# Patient Record
Sex: FEMALE | Race: BLACK OR AFRICAN AMERICAN | NOT HISPANIC OR LATINO | Employment: UNEMPLOYED | URBAN - METROPOLITAN AREA
[De-identification: names, ages, dates, MRNs, and addresses within clinical notes are randomized per-mention and may not be internally consistent; named-entity substitution may affect disease eponyms.]

---

## 2019-12-26 ENCOUNTER — HOSPITAL ENCOUNTER (OUTPATIENT)
Facility: HOSPITAL | Age: 34
Discharge: HOME | End: 2019-12-27
Attending: OBSTETRICS & GYNECOLOGY | Admitting: OBSTETRICS & GYNECOLOGY

## 2019-12-26 DIAGNOSIS — O46.90 VAGINAL BLEEDING DURING PREGNANCY: ICD-10-CM

## 2019-12-26 RX ORDER — LORATADINE 10 MG/1
10 TABLET ORAL
Status: ON HOLD | COMMUNITY
Start: 2019-02-28 | End: 2019-12-26 | Stop reason: CLARIF

## 2019-12-26 RX ORDER — ACETAMINOPHEN 500 MG
500 TABLET ORAL EVERY 6 HOURS PRN
Status: DISCONTINUED | OUTPATIENT
Start: 2019-12-27 | End: 2019-12-27 | Stop reason: HOSPADM

## 2019-12-26 RX ORDER — SODIUM CHLORIDE 9 MG/ML
INJECTION, SOLUTION INTRAVENOUS CONTINUOUS
Status: DISCONTINUED | OUTPATIENT
Start: 2019-12-27 | End: 2019-12-27 | Stop reason: HOSPADM

## 2019-12-26 RX ORDER — ONDANSETRON 8 MG/1
8 TABLET, ORALLY DISINTEGRATING ORAL EVERY 8 HOURS PRN
Status: DISCONTINUED | OUTPATIENT
Start: 2019-12-27 | End: 2019-12-27 | Stop reason: HOSPADM

## 2019-12-27 VITALS
TEMPERATURE: 98 F | SYSTOLIC BLOOD PRESSURE: 101 MMHG | RESPIRATION RATE: 18 BRPM | HEART RATE: 89 BPM | DIASTOLIC BLOOD PRESSURE: 62 MMHG

## 2019-12-27 LAB
ABO + RH BLD: NORMAL
BLD GP AB SCN CELLS X3 SERPL QL: NORMAL

## 2019-12-27 PROCEDURE — 99213 PR OFFICE/OUTPT VISIT, EST, LEVL III, 20-29 MIN: ICD-10-PCS | Mod: ,,, | Performed by: OBSTETRICS & GYNECOLOGY

## 2019-12-27 PROCEDURE — 99213 OFFICE O/P EST LOW 20 MIN: CPT | Mod: ,,, | Performed by: OBSTETRICS & GYNECOLOGY

## 2019-12-27 PROCEDURE — 99211 OFF/OP EST MAY X REQ PHY/QHP: CPT | Mod: 25,TH

## 2019-12-27 PROCEDURE — 86901 BLOOD TYPING SEROLOGIC RH(D): CPT

## 2019-12-27 NOTE — NURSING
Pt arrives to LD with complaints of bright bleeding that started this morning, pt placed in triage and monitor applied, CNM notified of pt arrival and complaints

## 2019-12-27 NOTE — NURSING
Spoke with Dr. Stewart from [facility] [place] in [place] where pt reports receives prenatal care, unable to obtain any records at this time

## 2019-12-27 NOTE — HPI
33 yo  20 weeks from home w/ vaginal bleeding. [place]. Staying locally for holiday w/e

## 2019-12-27 NOTE — DISCHARGE INSTRUCTIONS

## 2019-12-27 NOTE — PROGRESS NOTES
[facility] -   Obstetrics  Antepartum Progress Note    Patient Name: Charito Childs  MRN: [id number]  Admission Date: 2019  Hospital Length of Stay: 0 days  Attending Physician: Tiffany Hearn MD  Primary Care Provider: Rupal Anne NP    Subjective:     Principal Problem:Vaginal bleeding during pregnancy    HPI:  33 yo  20 weeks from home w/ vaginal bleeding. [place]. Staying locally for holiday w/e    Hospital Course:   20 weeks with vaginal bleeding  From [place] here visiting for holidays  Observe  FHTs via doppler  [place]  OB US- no evidence of previa  19 0530 US WNL, no contractions, cervix closed  T&S, if RH positive discharge home to follow up with OB MD, if RH negative rhogam prior to discharge.       Obstetric HPI:  Patient informed is B+  Patient reports None contractions, active fetal movement, absent vaginal bleeding , absent loss of fluid      Objective:     Vital Signs (Most Recent):  Temp: 98 °F (36.7 °C) (19 0711)  Pulse: 89 (19 0711)  Resp: 18 (19 0711)  BP: 101/62 (19 0711) Vital Signs (24h Range):  Temp:  [98 °F (36.7 °C)-98.7 °F (37.1 °C)] 98 °F (36.7 °C)  Pulse:  [87-89] 89  Resp:  [18] 18  BP: (101-121)/(62-78) 101/62        There is no height or weight on file to calculate BMI.    FHT:  Auscultated heart tones in 150's  TOCO: Acontractile.     No intake or output data in the 24 hours ending 19 0837    Cervical Exam:  Deferred as no cramping or bleeding at this time.      Significant Labs:  Recent Lab Results       19  0610        Group & Rh B POS     INDIRECT KAM NEG           Physical Exam:   Constitutional: She is oriented to person, place, and time. She appears well-developed and well-nourished.     Eyes: EOM are normal.      Pulmonary/Chest: Effort normal.        Abdominal: Soft.     Genitourinary: No vaginal discharge found.   Genitourinary Comments: Inspection of perineum w/o any vaginal discharge  or bleeding noted.            Musculoskeletal: Normal range of motion and moves all extremeties.       Neurological: She is alert and oriented to person, place, and time. She has normal reflexes.    Skin: Skin is warm and dry.    Psychiatric: She has a normal mood and affect.       Assessment/Plan:     34 y.o. female  at 20w2d for:    * Vaginal bleeding during pregnancy  Observe  OB US  FHTs via doppler  [place]  Spec exam with small amount of dark red blood,   SVE closed/thick  19 0530 T&S, Rhogam if RH neg  US WNL, no contractions, cervix closed  Discharge to follow up with OB MD Rakel Shah CNM  Obstetrics  [facility]

## 2019-12-27 NOTE — PROGRESS NOTES
[facility]  Obstetrics  Labor Progress Note    Patient Name: Charito Childs  MRN: [id number]  Admission Date: 2019  Hospital Length of Stay: 0 days  Attending Physician: Tiffany eHarn MD  Primary Care Provider: Rupal Anne NP    Subjective:     Principal Problem:Vaginal bleeding during pregnancy    Hospital Course:   20 weeks with vaginal bleeding  From [place] here visiting for holidays  Observe  FHTs via doppler  [place]  OB US  19 0530 US WNL, no contractions, cervix closed  T&S, if RH positive discharge home to follow up with OB MD, if RH negative rhogam prior to discharge    Interval History:  Charito is a 34 y.o.  at 20w2d. She is doing well.     Objective:     Vital Signs (Most Recent):  Temp: 98.7 °F (37.1 °C) (19 2315)  Pulse: 87 (19 2315)  Resp: 18 (19 2315)  BP: 121/78 (19 2315) Vital Signs (24h Range):  Temp:  [98.7 °F (37.1 °C)] 98.7 °F (37.1 °C)  Pulse:  [87] 87  Resp:  [18] 18  BP: (121)/(78) 121/78        There is no height or weight on file to calculate BMI.      TOCO:  none    Cervical Exam:  Dilation:    Effacement:    Station:   Presentation:      Significant Labs:  No results found for: GROUPTRH, HEPBSAG, RUBELLAIGGSC, STREPBCULT, AFP, [id number]    I have personallly reviewed all pertinent lab results from the last 24 hours.    Physical Exam    Assessment/Plan:     34 y.o. female  at 20w2d for:    * Vaginal bleeding during pregnancy  Observe  OB US  FHTs via doppler  [place]  Spec exam with small amount of dark red blood,   SVE closed/thick  19 0530 T&S, Rhogam if RH neg  US WNL, no contractions, cervix closed  Discharge to follow up with OB MD Radha Mueller, NISHA  Obstetrics  [facility]

## 2019-12-27 NOTE — ASSESSMENT & PLAN NOTE
Observe  OB US  FHTs via doppler  [place]  Spec exam with small amount of dark red blood,   SVE closed/thick

## 2019-12-27 NOTE — NURSING
Patient finished breakfast and is up to the bathroom. Patient instructed to get dress for discharge.

## 2019-12-27 NOTE — DISCHARGE SUMMARY
[facility]  Obstetrics  Discharge Summary      Patient Name: Charito Childs  MRN: [id number]  Admission Date: 2019  Hospital Length of Stay: 0 days  Discharge Date and Time:  2019 8:40 AM  Attending Physician: Tiffany Hearn MD   Discharging Provider: Rakel Shah CNM   Primary Care Provider: Rupal Anne NP    HPI: 35 yo  20 weeks from home w/ vaginal bleeding. [place] from [place]. Staying locally for holiday w/e    FHT:  FHT's 150's  TOCO:   No cramping.     * No surgery found *     Hospital Course:    20 weeks with vaginal bleeding  From [place] here visiting for holidays  Observe  FHTs via doppler  [place]  OB US- no evidence of previa  19 0530 US WNL, no contractions, cervix closed  T&S, if RH positive discharge home to follow up with OB MD, if RH negative rhogam prior to discharge.            Final Active Diagnoses:    Diagnosis Date Noted POA    PRINCIPAL PROBLEM:  Vaginal bleeding during pregnancy [O46.90] 2019 Yes      Problems Resolved During this Admission:        Labs: All labs within the past 24 hours have been reviewed    Feeding Method: undelivered    Immunizations     None          This patient has no babies on file.  Pending Diagnostic Studies:     None          Discharged Condition: good    Disposition: Home or Self Care    Follow Up:    Patient Instructions:   No discharge procedures on file.  Medications:  There are no discharge medications for this patient.      Rakel Shah CNM  Obstetrics  [facility]

## 2019-12-27 NOTE — SUBJECTIVE & OBJECTIVE
Obstetric HPI:  Patient informed is B+  Patient reports None contractions, active fetal movement, absent vaginal bleeding , absent loss of fluid      Objective:     Vital Signs (Most Recent):  Temp: 98 °F (36.7 °C) (12/27/19 0711)  Pulse: 89 (12/27/19 0711)  Resp: 18 (12/27/19 0711)  BP: 101/62 (12/27/19 0711) Vital Signs (24h Range):  Temp:  [98 °F (36.7 °C)-98.7 °F (37.1 °C)] 98 °F (36.7 °C)  Pulse:  [87-89] 89  Resp:  [18] 18  BP: (101-121)/(62-78) 101/62        There is no height or weight on file to calculate BMI.    FHT:  Auscultated heart tones in 150's  TOCO: Acontractile.     No intake or output data in the 24 hours ending 12/27/19 0837    Cervical Exam:  Deferred as no cramping or bleeding at this time.      Significant Labs:  Recent Lab Results       12/27/19  0610        Group & Rh B POS     INDIRECT KAM NEG           Physical Exam:   Constitutional: She is oriented to person, place, and time. She appears well-developed and well-nourished.     Eyes: EOM are normal.      Pulmonary/Chest: Effort normal.        Abdominal: Soft.     Genitourinary: No vaginal discharge found.   Genitourinary Comments: Inspection of perineum w/o any vaginal discharge or bleeding noted.            Musculoskeletal: Normal range of motion and moves all extremeties.       Neurological: She is alert and oriented to person, place, and time. She has normal reflexes.    Skin: Skin is warm and dry.    Psychiatric: She has a normal mood and affect.

## 2019-12-27 NOTE — SUBJECTIVE & OBJECTIVE
"Obstetric HPI:  Patient reports vaginal bleeding that started today while she was walking in [facility]. Reports here visiting family for holidays. Getting PNC in [place], [place].  with 4 term vaginal deliveries w/o complications, denies contractions, reports "sharp" pain at upper thighs. active fetal movement, Yes vaginal bleeding , No loss of fluid     This pregnancy has been complicated by denies    OB History    Para Term  AB Living   1 0 0 0 0 0   SAB TAB Ectopic Multiple Live Births   0 0 0 0 0      # Outcome Date GA Lbr Hany/2nd Weight Sex Delivery Anes PTL Lv   1 Current              No past medical history on file.  No past surgical history on file.    No medications prior to admission.       Review of patient's allergies indicates:  No Known Allergies     Family History     None        Tobacco Use    Smoking status: Not on file   Substance and Sexual Activity    Alcohol use: Not on file    Drug use: Not on file    Sexual activity: Not on file     Review of Systems   Constitutional: Negative for activity change, appetite change and fever.   Eyes: Negative for visual disturbance.   Respiratory: Negative for cough.    Cardiovascular: Negative for chest pain.   Gastrointestinal: Negative for abdominal pain, nausea and vomiting.   Genitourinary: Positive for vaginal bleeding. Negative for pelvic pain and vaginal discharge.   Musculoskeletal: Negative for back pain.   Neurological: Negative for headaches.   Psychiatric/Behavioral: Negative for depression. The patient is not nervous/anxious.       Objective:     Vital Signs (Most Recent):    Vital Signs (24h Range):           There is no height or weight on file to calculate BMI.    FHT: 144 via doppler  TOCO:  none    Physical Exam    Cervix:  Dilation:  0  Effacement:  0%  Station:   Presentation:      Significant Labs:  No results found for: GROUPTRH, HEPBSAG, RUBELLAIGGSC, STREPBCULT, AFP, [id number]    I have personallly reviewed all " pertinent lab results from the last 24 hours.

## 2019-12-27 NOTE — HOSPITAL COURSE
20 weeks with vaginal bleeding  From [place] here visiting for holidays  Observe  FHTs via doppler  [place]  OB US- no evidence of previa  19 0530 US WNL, no contractions, cervix closed  T&S, if RH positive discharge home to follow up with OB MD, if RH negative rhogam prior to discharge.

## 2019-12-27 NOTE — ASSESSMENT & PLAN NOTE
Observe  OB US  FHTs via doppler  [place]  Spec exam with small amount of dark red blood,   SVE closed/thick  12/27/19 0530 T&S, Rhogam if RH neg  US WNL, no contractions, cervix closed  Discharge to follow up with OB MD

## 2019-12-27 NOTE — SUBJECTIVE & OBJECTIVE
Interval History:  Charito is a 34 y.o.  at 20w2d. She is doing well.     Objective:     Vital Signs (Most Recent):  Temp: 98.7 °F (37.1 °C) (19)  Pulse: 87 (19)  Resp: 18 (19)  BP: 121/78 (19) Vital Signs (24h Range):  Temp:  [98.7 °F (37.1 °C)] 98.7 °F (37.1 °C)  Pulse:  [87] 87  Resp:  [18] 18  BP: (121)/(78) 121/78        There is no height or weight on file to calculate BMI.      TOCO:  none    Cervical Exam:  Dilation:    Effacement:    Station:   Presentation:      Significant Labs:  No results found for: GROUPTRH, HEPBSAG, RUBELLAIGGSC, STREPBCULT, AFP, [id number]    I have personallly reviewed all pertinent lab results from the last 24 hours.    Physical Exam

## 2019-12-27 NOTE — H&P
"[facility] -   Obstetrics  History & Physical    Patient Name: Charito Childs  MRN: [id number]  Admission Date: 2019  Primary Care Provider: Rupal Anne NP    Subjective:     Principal Problem:Vaginal bleeding during pregnancy    History of Present Illness:  35 yo  20 weeks from home w/ vaginal bleeding    Obstetric HPI:  Patient reports vaginal bleeding that [facility] today [facility] Reports here visiting family for holidays. Getting PNC in [place], [place].  with 4 term vaginal deliveries w/o complications, denies contractions, reports "sharp" pain at upper thighs. active fetal movement, Yes vaginal bleeding , No loss of fluid     This pregnancy has been complicated by denies    OB History    Para Term  AB Living   1 0 0 0 0 0   SAB TAB Ectopic Multiple Live Births   0 0 0 0 0      # Outcome Date GA Lbr Hany/2nd Weight Sex Delivery Anes PTL Lv   1 Current              No past medical history on file.  No past surgical history on file.    No medications prior to admission.       Review of patient's allergies indicates:  No Known Allergies     Family History     None        Tobacco Use    Smoking status: Not on file   Substance and Sexual Activity    Alcohol use: Not on file    Drug use: Not on file    Sexual activity: Not on file     Review of Systems   Constitutional: Negative for activity change, appetite change and fever.   Eyes: Negative for visual disturbance.   Respiratory: Negative for cough.    Cardiovascular: Negative for chest pain.   Gastrointestinal: Negative for abdominal pain, nausea and vomiting.   Genitourinary: Positive for vaginal bleeding. Negative for pelvic pain and vaginal discharge.   Musculoskeletal: Negative for back pain.   Neurological: Negative for headaches.   Psychiatric/Behavioral: Negative for depression. The patient is not nervous/anxious.       Objective:     Vital Signs (Most Recent):    Vital Signs (24h Range):   "         There is no height or weight on file to calculate BMI.    FHT: 144 via doppler  TOCO:  none    Physical Exam    Cervix:  Dilation:  0  Effacement:  0%  Station:   Presentation:      Significant Labs:  No results found for: GROUPTRH, HEPBSAG, RUBELLAIGGSC, STREPBCULT, AFP, [id number]    I have personallly reviewed all pertinent lab results from the last 24 hours.    Assessment/Plan:     34 y.o. female  at Unknown for:    * Vaginal bleeding during pregnancy  Observe  OB US  FHTs via doppler  [place]  Spec exam with small amount of dark red blood,   SVE closed/thick        Radha Mueller CNM  Obstetrics  [facility]

## 2019-12-27 NOTE — ASSESSMENT & PLAN NOTE
Observe  OB US  FHTs via doppler  [place]  Spec exam with small amount of dark red blood,   SVE closed/thick  12/27/19 0530 T&S, Rhogam if RH neg  US WNL, no contractions, cervix closed  Discharge to follow up with OB MD